# Patient Record
Sex: FEMALE | Race: WHITE
[De-identification: names, ages, dates, MRNs, and addresses within clinical notes are randomized per-mention and may not be internally consistent; named-entity substitution may affect disease eponyms.]

---

## 2017-01-05 ENCOUNTER — HOSPITAL ENCOUNTER (OUTPATIENT)
Dept: HOSPITAL 45 - C.RAD | Age: 72
Discharge: HOME | End: 2017-01-05
Attending: SPECIALIST
Payer: COMMERCIAL

## 2017-01-05 DIAGNOSIS — R91.8: ICD-10-CM

## 2017-01-05 DIAGNOSIS — R05: Primary | ICD-10-CM

## 2017-01-09 NOTE — CODING QUERY NO DIAGNOSIS
:  1945



TREATMENT RENDERED WITHOUT A DIAGNOSIS                                                  



To promote full compliance with coding requirements relating to patient care, physician 
participation is requested in all cases of  uncertainty.  Please assist us with 
providing a diagnosis/symptom for the test(s) below:



A diagnosis/symptom was not documented on your Order.  A valid diagnosis/symptom is 
required to bill all insurances.



**Please remember that we are unable to code a diagnosis of rule out, probable, possible, 
questionable, or suspected.  



Tests that require a diagnosis:

DOS:  17



* Chest X-ray, 2 Views      DIAGNOSIS:













Provider Signature: _____________________________ Date: _________



Thank you  

Lisa Lakhani

Health Information Management

Phone:  [phone]

Fax:  [phone]



Once completed, please kindly fax back to [phone]



For questions please call [phone]

## 2017-05-28 ENCOUNTER — HOSPITAL ENCOUNTER (OUTPATIENT)
Dept: HOSPITAL 45 - C.EDA | Age: 72
Setting detail: OBSERVATION
LOS: 1 days | Discharge: HOME | End: 2017-05-29
Attending: HOSPITALIST | Admitting: HOSPITALIST
Payer: COMMERCIAL

## 2017-05-28 VITALS
WEIGHT: 162.04 LBS | BODY MASS INDEX: 24.56 KG/M2 | WEIGHT: 162.04 LBS | HEIGHT: 68 IN | HEIGHT: 68 IN | BODY MASS INDEX: 24.56 KG/M2

## 2017-05-28 DIAGNOSIS — R07.89: Primary | ICD-10-CM

## 2017-05-28 DIAGNOSIS — E78.00: ICD-10-CM

## 2017-05-28 DIAGNOSIS — R03.0: ICD-10-CM

## 2017-05-28 DIAGNOSIS — Z82.49: ICD-10-CM

## 2017-05-28 DIAGNOSIS — Z79.82: ICD-10-CM

## 2017-05-28 DIAGNOSIS — E78.5: ICD-10-CM

## 2017-05-28 LAB
ALP SERPL-CCNC: 94 U/L (ref 45–117)
ALT SERPL-CCNC: 43 U/L (ref 12–78)
ANION GAP SERPL CALC-SCNC: 8 MMOL/L (ref 3–11)
AST SERPL-CCNC: 28 U/L (ref 15–37)
BASOPHILS # BLD: 0.02 K/UL (ref 0–0.2)
BASOPHILS NFR BLD: 0.4 %
BUN SERPL-MCNC: 17 MG/DL (ref 7–18)
BUN/CREAT SERPL: 20.6 (ref 10–20)
CALCIUM SERPL-MCNC: 9 MG/DL (ref 8.5–10.1)
CHLORIDE SERPL-SCNC: 104 MMOL/L (ref 98–107)
CO2 SERPL-SCNC: 29 MMOL/L (ref 21–32)
COMPLETE: YES
CREAT CL PREDICTED SERPL C-G-VRATE: 64.4 ML/MIN
CREAT SERPL-MCNC: 0.82 MG/DL (ref 0.6–1.2)
EOSINOPHIL NFR BLD AUTO: 189 K/UL (ref 130–400)
GLUCOSE SERPL-MCNC: 81 MG/DL (ref 70–99)
HCT VFR BLD CALC: 39.3 % (ref 37–47)
IG%: 0 %
IMM GRANULOCYTES NFR BLD AUTO: 33.2 %
LYMPHOCYTES # BLD: 1.77 K/UL (ref 1.2–3.4)
MCH RBC QN AUTO: 29.9 PG (ref 25–34)
MCHC RBC AUTO-ENTMCNC: 34.4 G/DL (ref 32–36)
MCV RBC AUTO: 86.9 FL (ref 80–100)
MONOCYTES NFR BLD: 14.3 %
NEUTROPHILS # BLD AUTO: 5.1 %
NEUTROPHILS NFR BLD AUTO: 47 %
PMV BLD AUTO: 9.1 FL (ref 7.4–10.4)
POTASSIUM SERPL-SCNC: 3.3 MMOL/L (ref 3.5–5.1)
RBC # BLD AUTO: 4.52 M/UL (ref 4.2–5.4)
SODIUM SERPL-SCNC: 141 MMOL/L (ref 136–145)
WBC # BLD AUTO: 5.33 K/UL (ref 4.8–10.8)

## 2017-05-28 NOTE — EMERGENCY ROOM VISIT NOTE
ED Visit Note


First contact with patient:  21:23


I did evaluate and examine this patient myself.  I did guide management for the 

patient. I agree with the APC's assessment as discussed.  Please see the APC's 

dictation for further details.  I did independently review the 12 EKG, chest x-

ray and blood work.

## 2017-05-28 NOTE — DIAGNOSTIC IMAGING REPORT
CHEST ONE VIEW PORTABLE



HISTORY: Atypical  CHEST PAIN



COMPARISON: Chest 1/5/2017.



FINDINGS: The lungs are clear. Cardiac silhouette is normal in size. No pleural

effusions. No pneumothorax.



IMPRESSION:

No acute process.







Electronically signed by:  Khang Khoury M.D.

5/28/2017 10:06 PM



Dictated Date/Time:  5/28/2017 10:05 PM

## 2017-05-29 VITALS
HEART RATE: 73 BPM | OXYGEN SATURATION: 97 % | TEMPERATURE: 98.06 F | DIASTOLIC BLOOD PRESSURE: 84 MMHG | SYSTOLIC BLOOD PRESSURE: 128 MMHG

## 2017-05-29 VITALS
TEMPERATURE: 97.7 F | SYSTOLIC BLOOD PRESSURE: 137 MMHG | HEART RATE: 63 BPM | DIASTOLIC BLOOD PRESSURE: 81 MMHG | OXYGEN SATURATION: 96 %

## 2017-05-29 VITALS
SYSTOLIC BLOOD PRESSURE: 137 MMHG | DIASTOLIC BLOOD PRESSURE: 81 MMHG | TEMPERATURE: 97.7 F | OXYGEN SATURATION: 96 % | HEART RATE: 63 BPM

## 2017-05-29 LAB
INR PPP: 1 (ref 0.9–1.1)
PROTHROMBIN TIME: 10.8 SECONDS (ref 9–12)

## 2017-05-29 NOTE — DISCHARGE INSTRUCTIONS
Discharge Instructions


Date of Service


May 29, 2017.





Admission


Reason for Admission:  Substernal Precordial Chest Pain





Discharge


Discharge Diagnosis / Problem:  atypical chest pain





Discharge Goals


Goal(s):  Diagnostic testing, Therapeutic intervention





Activity Recommendations


Activity Limitations:  resume your previous activity


please no intentional exertional activity until after stress test





light breakfast and no caffeine in am 5/30 until you hear if stress test is 

scheduled


.





Current Hospital Diet


Patient's current hospital diet: AHA Diet (Heart Healthy)





Discharge Diet


Recommended Diet:  Regular Diet





Pending Studies


Studies pending at discharge:  no





Medical Emergencies








.


Who to Call and When:





Medical Emergencies:  If at any time you feel your situation is an emergency, 

please call 911 immediately.





.





Non-Emergent Contact


Non-Emergency issues call your:  Primary Care Provider





.


.





"Provider Documentation" section prepared by Per Cunningham.








.





VTE Core Measure


Inpt VTE Proph given/why not?:  Unfractionated heparin SQ

## 2017-05-29 NOTE — DISCHARGE SUMMARY
Discharge Summary


Date of Service


May 29, 2017.





Discharge Summary


Admission Date:


May 29, 2017 at 01:05


Discharge Date:  May 29, 2017


Discharge Disposition:  Home


Principal Diagnosis:  atypical chest pain





Medication Reconciliation


Continued Medications:  


Aspirin (Aspirin Ec) 81 Mg Tab


81 MG PO DAILY





Atorvastatin (Lipitor) 10 Mg Tab


10 MG PO DAILY, TAB





Calcium Citrate-Vitamin D (Citracal + D3 Maximum) 1 Tab Tab


2 TABS PO QAM





Cyanocobalamin (Vitamin B-12) 100 Mcg Tab


100 MCG PO DAILY, TAB





Glucosamine-Chondroitin-Vit C- (Glucosamine Chondroitin) 1 Tab Tab


1 TAB PO DAILY





Multivitamins/Minerals (Mvi With Minerals)  Tab


1 TAB PO DAILY, TAB











Discharge Exam


Review of Systems:  


   Constitutional:  No chills, No fever


   Respiratory:  No cough, No dyspnea on exertion, No sputum


   Cardiovascular:  No chest pain, No orthopnea


   Abdomen:  No diarrhea, No nausea, No pain, No vomiting


   Neurologic:  No memory loss, No paralysis


   Psychiatric:  No anxiety, No depression symptoms


Physical Exam:  


   General Appearance:  WD/WN, no apparent distress


   Eyes:  PERRL, EOMI


   Neck:  supple, no JVD


   Respiratory/Chest:  chest non-tender, lungs clear, normal breath sounds


   Cardiovascular:  regular rate, rhythm, no murmur


   Abdomen / GI:  normal bowel sounds, non tender, soft


   Extremities:  no pedal edema, normal range of motion


   Neurologic/Psychiatric:  alert, oriented x 3





Hospital Course


pt with no  further chest pain even with walking around unit, also has been 

exercising the previous month without issues, she is agreeable to having an 

outpt stress test ordered this week and to go home with instructions to return 

if pain recurrs and to have no strenuous activity





will continue aspirin and follow up as outpt


Total Time Spent:  Greater than 30 minutes


This includes examination of the patient, discharge planning, medication 

reconciliation, and communication with other providers.





Discharge Instructions


Please refer to the electronic Patient Visit Report (Discharge Instructions) 

for additional information.

## 2017-05-29 NOTE — EMERGENCY ROOM VISIT NOTE
History


First contact with patient:  21:23


Chief Complaint:  CHEST PAIN


Stated Complaint:  CHEST PAIN


Nursing Triage Summary:  


pt walked up stairs and she developed a sharp pain in mid chest, sat down and 

it 


went away she got up again and she got the pain again radiating to right side 

of 


chest and called 911. pain then went away and when ems arrived and had her wak 

a 


couple steps to litter she developed a dull ache in mid chest radiating to 

right 


side of chest. pt given 2 baby asa enroute





History of Present Illness


The patient is a 71 year old female who presents to the Emergency Room with 

complaints of exertional chest pain tonight.  Patient states she got up to go 

to the kitchen developed chest pain and then went to sit down and the chest 

pain resolved.  She did this 3 times over.  She is currently chest pain-free.  

She takes a daily baby aspirin and EMS gave her 2 more.  Patient states she is 

currently asymptomatic.  Her father  of a massive heart attack at age 58.  

She does stress test one year ago that was normal per patient.  Patient does 

have high cholesterol.  Patient denies diabetes, high blood pressure, tobacco 

use.  Patient states she gets the gym 4 times a week without difficulties.  She 

does circuit training.  Patient denies dyspnea, fever, chills, diaphoresis, 

nausea, vomiting, diarrhea, back pain, radiating pain, leg pain or swelling.





Review of Systems


See HPI for pertinent positives & negatives. A total of 10 systems reviewed and 

were otherwise negative.





Past Medical/Surgical History


Hyperlipidemia





Social History


Smoking Status:  Never Smoker


Smokeless Tobacco Use:  No


Alcohol Use:  occasionally


Drug Use:  none


Marital Status:  


Housing Status:  lives with family


Occupation Status:  retired





Current/Historical Medications


Scheduled


Aspirin (Aspirin Ec), 81 MG PO DAILY


Atorvastatin (Lipitor), 10 MG PO DAILY


Calcium Citrate-Vitamin D (Citracal + D3 Maximum), 2 TABS PO QAM


Cyanocobalamin (Vitamin B-12), 100 MCG PO DAILY


Glucosamine-Chondroitin-Vit C- (Glucosamine Chondroitin), 1 TAB PO DAILY


Multivitamins/Minerals (Mvi With Minerals), 1 TAB PO DAILY





Allergies


Coded Allergies:  


     Amoxicillin (Verified  Allergy, Intermediate, RASH, 17)





Physical Exam


Vital Signs











  Date Time  Temp Pulse Resp B/P Pulse Ox O2 Delivery O2 Flow Rate FiO2


 


17 00:05  67 26  98   


 


17 00:01    136/79    


 


17 23:35  67 21  96   


 


17 23:32    112/66    


 


17 23:05  63 16  99   


 


17 23:01    134/73    


 


17 22:35  70 17  94   


 


17 22:30  66 15 121/66 96 Room Air  


 


17 22:00  67 18 120/64 95 Room Air  


 


17 21:30  72 19 145/80 98 Room Air  


 


17 21:22  72      


 


17 21:16     97 Room Air  


 


17 21:16 36.5 72 16 157/71 97 Room Air  


 


17 21:16     97 Room Air  











Physical Exam


VITALS: Vitals are noted on the nurse's note and reviewed by myself.  Vital 

signs hypertensive


GENERAL: Pleasant female, in no acute distress, nondiaphoretic, well-developed 

well-nourished.


SKIN: The skin was without rashes, erythema, edema, or bruising.  There is no 

tenting of the skin.  Capillary reflex less than 2 seconds.


HEAD: Normocephalic atraumatic.  


EARS: External auditory canals clear, tympanic membranes pearly gray without 

erythema or effusion bilaterally.


EYES: Pupils equal round and reactive to light and accommodation.  Conjunctivae 

without injection, sclerae without icterus.  Extraocular movements intact.  


NOSE: Patent, turbinates without inflammation or discharge.   


MOUTH: Mucous membranes moist   Pharynx without erythema or exudate.  Uvula 

midline.  Airway patent.  Tongue does not deviate.  


NECK: Supple without nuchal rigidity.  No lymphadenopathy.  No thyromegaly.  

Cervical spine is nontender.  No JVD.


HEART: Regular rate and rhythm  


LUNGS: Clear to auscultation bilaterally without wheezes, rales or rhonchi.  No 

dullness to percussion.  No retractions or accessory muscle use.  Chest 

nontender to palpation


ABDOMEN: Positive bowel sounds x 4.  Normal tympanic percussion.  Soft, 

nontender, without masses or organomegaly.  Wolfe sign negative.  No guarding 

or rebound tenderness.


MUSCULOSKELETAL: No muscle atrophy, erythema, or edema noted.   


NEURO: Patient was alert and oriented to person place and time.  Normal 

sensation to light and sharp touch.  No focal neurological deficits.





Medical Decision & Procedures


Laboratory Results


17 21:20








Red Blood Count 4.52, Mean Corpuscular Volume 86.9, Mean Corpuscular Hemoglobin 

29.9, Mean Corpuscular Hemoglobin Concent 34.4, Mean Platelet Volume 9.1, 

Neutrophils (%) (Auto) 47.0, Lymphocytes (%) (Auto) 33.2, Monocytes (%) (Auto) 

14.3, Eosinophils (%) (Auto) 5.1, Basophils (%) (Auto) 0.4, Neutrophils # (Auto

) 2.51, Lymphocytes # (Auto) 1.77, Monocytes # (Auto) 0.76, Eosinophils # (Auto

) 0.27, Basophils # (Auto) 0.02





17 21:20

















Test


  17


21:20 17


21:27


 


White Blood Count


  5.33 K/uL


(4.8-10.8) 


 


 


Red Blood Count


  4.52 M/uL


(4.2-5.4) 


 


 


Hemoglobin


  13.5 g/dL


(12.0-16.0) 


 


 


Hematocrit 39.3 % (37-47)  


 


Mean Corpuscular Volume


  86.9 fL


() 


 


 


Mean Corpuscular Hemoglobin


  29.9 pg


(25-34) 


 


 


Mean Corpuscular Hemoglobin


Concent 34.4 g/dl


(32-36) 


 


 


Platelet Count


  189 K/uL


(130-400) 


 


 


Mean Platelet Volume


  9.1 fL


(7.4-10.4) 


 


 


Neutrophils (%) (Auto) 47.0 %  


 


Lymphocytes (%) (Auto) 33.2 %  


 


Monocytes (%) (Auto) 14.3 %  


 


Eosinophils (%) (Auto) 5.1 %  


 


Basophils (%) (Auto) 0.4 %  


 


Neutrophils # (Auto)


  2.51 K/uL


(1.4-6.5) 


 


 


Lymphocytes # (Auto)


  1.77 K/uL


(1.2-3.4) 


 


 


Monocytes # (Auto)


  0.76 K/uL


(0.11-0.59) 


 


 


Eosinophils # (Auto)


  0.27 K/uL


(0-0.5) 


 


 


Basophils # (Auto)


  0.02 K/uL


(0-0.2) 


 


 


RDW Standard Deviation


  40.4 fL


(36.4-46.3) 


 


 


RDW Coefficient of Variation


  12.7 %


(11.5-14.5) 


 


 


Immature Granulocyte % (Auto) 0.0 %  


 


Immature Granulocyte # (Auto)


  0.00 K/uL


(0.00-0.02) 


 


 


Anion Gap


  8.0 mmol/L


(3-11) 


 


 


Est Creatinine Clear Calc


Drug Dose 64.4 ml/min 


  


 


 


Estimated GFR (


American) 84.0 


  


 


 


Estimated GFR (Non-


American 72.5 


  


 


 


BUN/Creatinine Ratio 20.6 (10-20)  


 


Calcium Level


  9.0 mg/dl


(8.5-10.1) 


 


 


Total Bilirubin


  0.8 mg/dl


(0.2-1) 


 


 


Direct Bilirubin


  0.2 mg/dl


(0-0.2) 


 


 


Aspartate Amino Transf


(AST/SGOT) 28 U/L (15-37) 


  


 


 


Alanine Aminotransferase


(ALT/SGPT) 43 U/L (12-78) 


  


 


 


Alkaline Phosphatase


  94 U/L


() 


 


 


Troponin I


  < 0.015 ng/ml


(0-0.045) 


 


 


Total Protein


  8.0 gm/dl


(6.4-8.2) 


 


 


Albumin


  4.0 gm/dl


(3.4-5.0) 


 


 


Lipase


  194 U/L


() 


 


 


Bedside Troponin I


  


  0.000 ng/ml


(0-0.045)











Medications Administered











 Medications


  (Trade)  Dose


 Ordered  Sig/Ulysses


 Route  Start Time


 Stop Time Status Last Admin


Dose Admin


 


 Potassium Chloride


  (Klor-Con M10)  20 meq  NOW  STAT


 PO  17 22:32


 17 22:33 DC 17 22:53


20 MEQ











ED Course


Prior records/ancillary studies reviewed.


Triage Nursing notes reviewed.


Additional history obtained from family.


The patient's history was concerning for chest pain. 





Differential diagnosis:


Etiologies such as cardiac ischemia, aortic dissection, pulmonary embolism, 

pneumonia, pneumothorax, musculoskeletal, infections, pericarditis, myocarditis

, esophageal rupture, gastrointestinal, as well as others were entertained. 





Physical examination:


As above.





ER treatment provided:


Patient was observed.  She was ready given aspirin and is currently pain-free


On reassessment the patient felt better.





Diagnostic interpretation by me:


The electrocardiogram was negative for pathologic change.  Normal sinus, normal 

intervals, no acute ST-T wave changes.  Impression normal sinus rhythm 

interpreted by myself





The labs revealed negative troponin.  Stable H&H





Imaging studies:


Chest x-ray as above


CHEST ONE VIEW PORTABLE





HISTORY: Atypical  CHEST PAIN





COMPARISON: Chest 2017.





FINDINGS: The lungs are clear. Cardiac silhouette is normal in size. No pleural


effusions. No pneumothorax.





IMPRESSION:


No acute process.





Consultation:


A consultation was placed with the hospitalist, Dr Amaya. The case was 

discussed and diagnostics were reviewed. The patient was evaluated in the ER 

for further treatment. 





Exam and history seem consistent with chest pain concerns for cardiac and 

etiology.  Symptoms were exertional.  They resolved with rest.  No recent 

stress test or echo.  She has a family history of heart disease.  She does have 

high cholesterol.  She will be evaluated by medicine for possible admission for 

cardiac rule out.  She had a normal EKG and first troponin was negative.  Her 

symptoms were less than 6 hours ago though.By the evaluation outlined above 

emergent etiologies such as  aortic dissection, pulmonary embolism, pneumonia, 

pneumothorax, infections, pericarditis, myocarditis, gastrointestinal, as well 

as others were deemed relatively unlikely. 





The pt informed about the findings as listed above. All questions were answered 

and  pleased with the treatment. 





Case reviewed by attending.





Medical Decision


As above





Impression





 Primary Impression:  


 Substernal precordial chest pain


 Additional Impression:  


 Elevated blood pressure reading





Departure Information


Dispostion


Being Evaluated By Hospitalist





Condition


FAIR





Referrals


Heather Menjivar M.D. (PCP)





Patient Instructions


My [facility]





Problem Qualifiers

## 2017-05-29 NOTE — HISTORY AND PHYSICAL
History & Physical


Date & Time of Service:


May 29, 2017 at 01:22


Chief Complaint:


Chest Pain


Primary Care Physician:


Heather Menjivar M.D.


History of Present Illness


Source:  patient


72 y/o F Hx HPL.  Pt developed central CP when getting up off her couch which 

abated after sitting down for a few minutes.  She then got up again and the 

pain recurred prompting her to attend the hospital.  The pain was nonradiating 

and she denies N/V, SOB, diaphoresis or lightheadedness.  She denies previous 

episodes of CP.





Past Medical/Surgical History


1) Hyperlipidemia





Family History


Father  from MI at age 58


Mother  at age 94 - unspecified cause





Social History


Smoking Status:  Never Smoker


Smokeless Tobacco Use:  No


Alcohol Use:  occasionally


Drug Use:  none


Marital Status:  


Occupational Status:  retired





Multi-Drug Resistant Organisms


History of MDRO:  No





Allergies


Coded Allergies:  


     Amoxicillin (Verified  Allergy, Intermediate, RASH, 17)





Home Medications


Scheduled


Aspirin (Aspirin Ec), 81 MG PO DAILY


Atorvastatin (Lipitor), 10 MG PO DAILY


Calcium Citrate-Vitamin D (Citracal + D3 Maximum), 2 TABS PO QAM


Cyanocobalamin (Vitamin B-12), 100 MCG PO DAILY


Glucosamine-Chondroitin-Vit C- (Glucosamine Chondroitin), 1 TAB PO DAILY


Multivitamins/Minerals (Mvi With Minerals), 1 TAB PO DAILY





Review of Systems


Constitutional:  No chills, No fever, No sweats


Eyes:  No eye pain, No worsening of vision


ENT:  No hearing loss, No nasal symptoms, No unusual epistaxis


Respiratory:  No cough, No sputum, No wheezing


Cardiovascular:  + chest pain, No PND, No orthopnea


Abdomen:  No nausea, No pain, No vomiting


Musculoskeletal:  No joint pain, No muscle pain


Genitourinary - Female:  No dysuria, No hematuria, No urinary frequency, No 

urinary incontinence, No urinary retention, No urinary urgency


Neurologic:  No memory loss, No paralysis, No weakness


Psychiatric:  No depression symptoms


Endocrine:  No fatigue


Integumentary:  No rash


Allergic / Immunologic:  No environmental allergies





Physical Exam


Vital Signs











  Date Time  Temp Pulse Resp B/P Pulse Ox O2 Delivery O2 Flow Rate FiO2


 


17 00:05  67 26  98   


 


17 00:01    136/79    


 


17 23:35  67 21  96   


 


17 23:32    112/66    


 


5/28/17 23:05  63 16  99   


 


17 23:01    134/73    


 


17 22:35  70 17  94   


 


17 22:30  66 15 121/66 96 Room Air  


 


17 22:00  67 18 120/64 95 Room Air  


 


17 21:30  72 19 145/80 98 Room Air  


 


17 21:22  72      


 


17 21:16     97 Room Air  


 


17 21:16 36.5 72 16 157/71 97 Room Air  


 


17 21:16     97 Room Air  








General Appearance:  WD/WN, no apparent distress


Head:  normocephalic, atraumatic


Eyes:  normal inspection, PERRL, EOMI


ENT:  normal ENT inspection, pharynx normal


Neck:  supple, no JVD


Respiratory/Chest:  chest non-tender, lungs clear, normal breath sounds


Cardiovascular:  regular rate, rhythm, no edema, no gallop, no JVD, no murmur, 

normal peripheral pulses


Abdomen/GI:  normal bowel sounds, non tender, soft


Back:  normal inspection, no CVA tenderness, no muscle spasm, normal range of 

motion


Extremities/Musculoskelatal:  normal inspection, no calf tenderness, normal 

capillary refill


Neurologic/Psych:  CNs II-XII nml as tested, no motor/sensory deficits, alert, 

normal mood/affect, normal reflexes, oriented x 3


Skin:  normal color, warm/dry, no rash





Diagnostics


Laboratory Results





Results Past 24 Hours








Test


  17


21:20 17


21:27 17


01:14 Range/Units


 


 


White Blood Count 5.33   4.8-10.8  K/uL


 


Red Blood Count 4.52   4.2-5.4  M/uL


 


Hemoglobin 13.5   12.0-16.0  g/dL


 


Hematocrit 39.3   37-47  %


 


Mean Corpuscular Volume 86.9     fL


 


Mean Corpuscular Hemoglobin 29.9   25-34  pg


 


Mean Corpuscular Hemoglobin


Concent 34.4


  


  


  32-36  g/dl


 


 


Platelet Count 189   130-400  K/uL


 


Mean Platelet Volume 9.1   7.4-10.4  fL


 


Neutrophils (%) (Auto) 47.0    %


 


Lymphocytes (%) (Auto) 33.2    %


 


Monocytes (%) (Auto) 14.3    %


 


Eosinophils (%) (Auto) 5.1    %


 


Basophils (%) (Auto) 0.4    %


 


Neutrophils # (Auto) 2.51   1.4-6.5  K/uL


 


Lymphocytes # (Auto) 1.77   1.2-3.4  K/uL


 


Monocytes # (Auto) 0.76   0.11-0.59  K/uL


 


Eosinophils # (Auto) 0.27   0-0.5  K/uL


 


Basophils # (Auto) 0.02   0-0.2  K/uL


 


RDW Standard Deviation 40.4   36.4-46.3  fL


 


RDW Coefficient of Variation 12.7   11.5-14.5  %


 


Immature Granulocyte % (Auto) 0.0    %


 


Immature Granulocyte # (Auto) 0.00   0.00-0.02  K/uL


 


Sodium Level 141   136-145  mmol/L


 


Potassium Level 3.3   3.5-5.1  mmol/L


 


Chloride Level 104     mmol/L


 


Carbon Dioxide Level 29   21-32  mmol/L


 


Anion Gap 8.0   3-11  mmol/L


 


Blood Urea Nitrogen 17   7-18  mg/dl


 


Creatinine


  0.82


  


  


  0.60-1.20


mg/dl


 


Est Creatinine Clear Calc


Drug Dose 64.4


  


  


   ml/min


 


 


Estimated GFR (


American) 84.0


  


  


   


 


 


Estimated GFR (Non-


American 72.5


  


  


   


 


 


BUN/Creatinine Ratio 20.6   10-20  


 


Random Glucose 81   70-99  mg/dl


 


Calcium Level 9.0   8.5-10.1  mg/dl


 


Total Bilirubin 0.8   0.2-1  mg/dl


 


Direct Bilirubin 0.2   0-0.2  mg/dl


 


Aspartate Amino Transf


(AST/SGOT) 28


  


  


  15-37  U/L


 


 


Alanine Aminotransferase


(ALT/SGPT) 43


  


  


  12-78  U/L


 


 


Alkaline Phosphatase 94     U/L


 


Troponin I < 0.015   0-0.045  ng/ml


 


Total Protein 8.0   6.4-8.2  gm/dl


 


Albumin 4.0   3.4-5.0  gm/dl


 


Lipase 194     U/L


 


Bedside Troponin I  0.000  0-0.045  ng/ml











EKG


Sinus - PACs - no acute ischemic changes





Impression


Assessment and Plan


72 y/o F Hx HPL.  Pt developed central CP when getting up off her couch which 

abated after sitting down for a few minutes.  She then got up again and the 

pain recurred prompting her to attend the hospital.  The pain was nonradiating 

and she denies N/V, SOB, diaphoresis or lightheadedness.





1) CP - pt will be monitored on telemetry - serial enzymes ordered, NTG or 

Morphine PRN for CP.  Should likely have a stress test as the pain appeared to 

be exertional.  It is noted that she has since ambulated about the hospital 

room without recurrence, so that if her enzymes are negative, a stress can 

likely be scheduled in the outpt setting





2) HPL - cont Statin











Ful code - Heparin prophylaxis 


Total time for this admit including review of labs, meds, EKG - discussion with 

pt and ER attending





Level of Care


Telemetry





Resuscitation Status


FULL RESUSCITATION





VTE Prophylaxis


VTE Risk Assessment Done? Y/N:  Yes


Risk Level:  Low


Given or contraindicated:  Unfractionated heparin SQ

## 2017-09-19 ENCOUNTER — HOSPITAL ENCOUNTER (OUTPATIENT)
Dept: HOSPITAL 45 - C.MAMM | Age: 72
Discharge: HOME | End: 2017-09-19
Attending: OBSTETRICS & GYNECOLOGY
Payer: COMMERCIAL

## 2017-09-19 DIAGNOSIS — Z12.31: Primary | ICD-10-CM

## 2017-09-20 NOTE — MAMMOGRAPHY REPORT
BILATERAL DIGITAL SCREENING MAMMOGRAM WITH CAD: 9/19/2017

CLINICAL HISTORY: Routine screening.  Patient has no complaints.  





TECHNIQUE: Bilateral CC and MLO views were obtained.  Current study was also evaluated with a Compute
r Aided Detection (CAD) system.  



COMPARISON: Comparison is made to exams dated:  9/15/2016 mammogram, 9/11/2015 mammogram, 9/10/2014 m
ammogram, 9/9/2013 mammogram, 9/6/2012 mammogram, and 9/6/2011 mammogram - [facility]
er.   



BREAST COMPOSITION:  There are scattered areas of fibroglandular density in both breasts.  



FINDINGS:  There is stable asymmetry in the superior left breast, and benign rim calcifications in th
e left breast. No suspicious mass, architectural distortion or cluster of suspicious microcalcificati
ons is seen.  



IMPRESSION:  ACR BI-RADS CATEGORY 2: BENIGN

There is no mammographic evidence of malignancy. A 1 year screening mammogram is recommended.  The pa
tient will receive written notification of the results.  





Approximately 10% of breast cancers are not detected with mammography. A negative mammographic report
 should not delay biopsy if a clinically suggestive mass is present.



Chichi Crabtree M.D.          

ay/:9/19/2017 15:09:25  



Imaging Technologist: Rochelle CHU,R, M, [facility]

letter sent: Normal 1/2  

BI-RADS Code: ACR BI-RADS Category 2: Benign

## 2017-12-26 ENCOUNTER — HOSPITAL ENCOUNTER (OUTPATIENT)
Dept: HOSPITAL 45 - C.RAD1850 | Age: 72
Discharge: HOME | End: 2017-12-26
Attending: FAMILY MEDICINE
Payer: COMMERCIAL

## 2017-12-26 DIAGNOSIS — J18.9: Primary | ICD-10-CM

## 2017-12-26 NOTE — DIAGNOSTIC IMAGING REPORT
CHEST 2 VIEWS ROUTINE



CLINICAL HISTORY: PNEUMONIA, UNSPECIFIED ORGANISM    



COMPARISON STUDY:  5/28/2017



FINDINGS: The bones soft tissues and hemidiaphragms are normal. The

cardiomediastinal silhouette is normal. The lungs are clear. The pulmonary

vasculature is normal. 



IMPRESSION:  Negative chest. 











The above report was generated using voice recognition software.  It may contain

grammatical, syntax or spelling errors.









Electronically signed by:  Jarod Lyons M.D.

12/26/2017 3:07 PM



Dictated Date/Time:  12/26/2017 3:06 PM

## 2018-02-27 ENCOUNTER — HOSPITAL ENCOUNTER (OUTPATIENT)
Dept: HOSPITAL 45 - C.MAMM | Age: 73
Discharge: HOME | End: 2018-02-27
Attending: FAMILY MEDICINE
Payer: COMMERCIAL

## 2018-02-27 DIAGNOSIS — M85.852: ICD-10-CM

## 2018-02-27 DIAGNOSIS — M85.88: Primary | ICD-10-CM

## 2018-02-27 DIAGNOSIS — M85.851: ICD-10-CM

## 2018-03-29 ENCOUNTER — HOSPITAL ENCOUNTER (OUTPATIENT)
Dept: HOSPITAL 45 - C.RAD1850 | Age: 73
Discharge: HOME | End: 2018-03-29
Attending: NURSE PRACTITIONER
Payer: COMMERCIAL
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
## 2018-03-29 DIAGNOSIS — R07.81: Primary | ICD-10-CM

## 2018-03-29 NOTE — DIAGNOSTIC IMAGING REPORT
CHEST 2 VIEWS ROUTINE



CLINICAL HISTORY: PLEURODYNIA, FALL    



COMPARISON STUDY:  12/26/2017



FINDINGS: The cardiac and mediastinal contours are normal. There is no evidence

of focal pulmonary consolidation. There is no evidence of failure. No pleural

effusions are visualized.[ No pneumothorax is visualized.



IMPRESSION: No active disease in the chest.







Electronically signed by:  Vargas Beckford M.D.

3/29/2018 11:15 AM



Dictated Date/Time:  3/29/2018 11:15 AM

## 2018-07-18 ENCOUNTER — HOSPITAL ENCOUNTER (OUTPATIENT)
Dept: HOSPITAL 45 - C.RAD1850 | Age: 73
Discharge: HOME | End: 2018-07-18
Attending: STUDENT IN AN ORGANIZED HEALTH CARE EDUCATION/TRAINING PROGRAM
Payer: COMMERCIAL
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
## 2018-07-18 DIAGNOSIS — R07.81: ICD-10-CM

## 2018-07-18 DIAGNOSIS — M81.0: Primary | ICD-10-CM

## 2018-07-18 NOTE — DIAGNOSTIC IMAGING REPORT
R RIBS UNILATERAL WITH PA CHEST



CLINICAL HISTORY: Right rib pain following injury.    



COMPARISON STUDY:  Chest radiograph March 29, 2018.



FINDINGS: There is no pneumothorax or pleural effusion. There is suspected

biapical scarring which is unchanged. There is no consolidation or evidence for

pulmonary edema. Cardiac size is normal. Midsternal contours are normal. No

right-sided rib fractures are identified by radiography.



IMPRESSION:  No pneumothorax. No right rib fractures identified. 









Electronically signed by:  Ralph Coronado M.D.

7/18/2018 5:16 PM



Dictated Date/Time:  7/18/2018 5:10 PM